# Patient Record
(demographics unavailable — no encounter records)

---

## 2025-01-03 NOTE — HISTORY OF PRESENT ILLNESS
[FreeTextEntry1] : This is a 66 year old female originally referred by Dr. Henao for a painful left breast mass. She was last seen by Dr. Hernandez 3 years ago. 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 She is a s/p a left FNA. She had a history bilateral mastodynia, left more than right. She has not had any breast imaging since 6/25/2015 which was WNL.   Patient presents today with c/o occasional bilateral breast tenderness L>R.  Patient is s/p bilateral breast FNA of bilateral cysts on 2/27/2018. On the right a total of 30cc of brownish material was aspirated with complete collapse of the cyst. On the left, a total of 35cc of brownish fluid was aspirated with complete collapse of the cyst.  The aspirated material was not sent for analysis.   She has significant depression and has stress related to her son's psychiatric illness. She states her breast pain is better.  She does SBE.  She has noticed a change in her right breast or a right breast lump. She has not noticed a change in her nipple or nipple area. She has not noticed a change in the skin of the breast. She is not experiencing nipple discharge. She is not experiencing breast pain. She has not noticed a lump or lymph node under the armpit.   BREAST CANCER RISK FACTORS Menarche: 12 Date of LMP: 50 Menopause: Grav: 3     Para: 2 Age at first live birth: 32  Nursed: N Hysterectomy: N Oophorectomy: N  OCP: Y <1y HRT: Y estrogen patch <1y 5 years ago, currently on Estrace Last pap/pelvic exam: 2023 WNL  Related family history: N  Ashkenazi: N  Mastery risk assessment: BRCAPRO 8.6% TCv6 8.2% TCv7 11.1% Rebekah 9.9% Paras n/a BRCA testing: N  Bra size: 38C  Last mammogram: 12/23/2024           Location: [facility]  Report reviewed.                                 Images reviewed. Results: BIRADS 2 Extremely dense breasts, with no evidence of malignancy.    Last ultrasound:  12/23/2024             Location: [facility]  Report reviewed.                                 Images reviewed.  Results: BIRADS 2 Waxing and waning cysts, no sonographic evidence of malignancy.  Last MRI:  Never                                           Location: Report reviewed.

## 2025-01-03 NOTE — PHYSICAL EXAM
[Normocephalic] : normocephalic [Atraumatic] : atraumatic [Supple] : supple [No Supraclavicular Adenopathy] : no supraclavicular adenopathy [Examined in the supine and seated position] : examined in the supine and seated position [Asymmetrical] : asymmetrical [No Nipple Retraction] : no left nipple retraction [No Nipple Discharge] : no left nipple discharge [No Axillary Lymphadenopathy] : no left axillary lymphadenopathy [No Edema] : no edema [No Rashes] : no rashes [No Ulceration] : no ulceration [de-identified] : there is a subcentimeter cm dominant mass in area of pt concern which is smaller than prior [de-identified] : there is a central mass with nodularity which is smaller than prior

## 2025-01-03 NOTE — HISTORY OF PRESENT ILLNESS
[FreeTextEntry1] : This is a 66 year old female originally referred by Dr. Henao for a painful left breast mass. She was last seen by Dr. Hernandez 3 years ago. 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 She is a s/p a left FNA. She had a history bilateral mastodynia, left more than right. She has not had any breast imaging since 6/25/2015 which was WNL.   Patient presents today with c/o occasional bilateral breast tenderness L>R.  Patient is s/p bilateral breast FNA of bilateral cysts on 2/27/2018. On the right a total of 30cc of brownish material was aspirated with complete collapse of the cyst. On the left, a total of 35cc of brownish fluid was aspirated with complete collapse of the cyst.  The aspirated material was not sent for analysis.   She has significant depression and has stress related to her son's psychiatric illness. She states her breast pain is better.  She does SBE.  She has noticed a change in her right breast or a right breast lump. She has not noticed a change in her nipple or nipple area. She has not noticed a change in the skin of the breast. She is not experiencing nipple discharge. She is not experiencing breast pain. She has not noticed a lump or lymph node under the armpit.   BREAST CANCER RISK FACTORS Menarche: 12 Date of LMP: 50 Menopause: Grav: 3     Para: 2 Age at first live birth: 32  Nursed: N Hysterectomy: N Oophorectomy: N  OCP: Y <1y HRT: Y estrogen patch <1y 5 years ago, currently on Estrace Last pap/pelvic exam: 2023 WNL  Related family history: N  Ashkenazi: N  Mastery risk assessment: BRCAPRO 8.6% TCv6 8.2% TCv7 11.1% Rebekah 9.9% Paras n/a BRCA testing: N  Bra size: 38C  Last mammogram: 12/23/2024           Location: [facility]  Report reviewed.                                 Images reviewed. Results: BIRADS 2 Extremely dense breasts, with no evidence of malignancy.    Last ultrasound:  12/23/2024             Location: [facility]  Report reviewed.                                 Images reviewed.  Results: BIRADS 2 Waxing and waning cysts, no sonographic evidence of malignancy.  Last MRI:  Never                                           Location: Report reviewed.

## 2025-01-03 NOTE — CONSULT LETTER
[Dear  ___] : Dear  [unfilled], [Courtesy Letter:] : I had the pleasure of seeing your patient, [unfilled], in my office today. [Please see my note below.] : Please see my note below. [Consult Closing:] : Thank you very much for allowing me to participate in the care of this patient.  If you have any questions, please do not hesitate to contact me. [Sincerely,] : Sincerely, [FreeTextEntry3] : Gina [place] [address]

## 2025-01-03 NOTE — CONSULT LETTER
[Dear  ___] : Dear  [unfilled], [Courtesy Letter:] : I had the pleasure of seeing your patient, [unfilled], in my office today. [Please see my note below.] : Please see my note below. [Consult Closing:] : Thank you very much for allowing me to participate in the care of this patient.  If you have any questions, please do not hesitate to contact me. [Sincerely,] : Sincerely, [FreeTextEntry3] : [place] [address]

## 2025-01-03 NOTE — ASSESSMENT
[FreeTextEntry1] : Dense breast tissue (793.82) (R92.2) Breast cancer screening by mammogram (V76.12) (Z12.31) 67 yo female with FCD  plan mg/sono 12/2025  We reviewed risk reduction strategies including maintaining a BMI <25, limiting red meat intake and alcoholic beverages to 3 per week and exercise (150 min/ week low intensity or 75 min/week high intensity). And maintaining a normal vitamin D level.    f/u 1 year  She knows to call or return sooner should any concerns or questions arise.

## 2025-01-03 NOTE — ASSESSMENT
[FreeTextEntry1] : Dense breast tissue (793.82) (R92.2) Breast cancer screening by mammogram (V76.12) (Z12.31) 65 yo female with FCD  plan mg/sono 12/2025  We reviewed risk reduction strategies including maintaining a BMI <25, limiting red meat intake and alcoholic beverages to 3 per week and exercise (150 min/ week low intensity or 75 min/week high intensity). And maintaining a normal vitamin D level.    f/u 1 year  She knows to call or return sooner should any concerns or questions arise.

## 2025-01-03 NOTE — HISTORY OF PRESENT ILLNESS
[FreeTextEntry1] : This is a 66 year old female originally referred by Dr. Henao for a painful left breast mass. She was last seen by Dr. Hernandez 3 years ago. 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 She is a s/p a left FNA. She had a history bilateral mastodynia, left more than right. She has not had any breast imaging since 6/25/2015 which was WNL.   Patient presents today with c/o occasional bilateral breast tenderness L>R.  Patient is s/p bilateral breast FNA of bilateral cysts on 2/27/2018. On the right a total of 30cc of brownish material was aspirated with complete collapse of the cyst. On the left, a total of 35cc of brownish fluid was aspirated with complete collapse of the cyst.  The aspirated material was not sent for analysis.   She has significant depression and has stress related to her son's psychiatric illness. She states her breast pain is better.  She does SBE.  She has noticed a change in her right breast or a right breast lump. She has not noticed a change in her nipple or nipple area. She has not noticed a change in the skin of the breast. She is not experiencing nipple discharge. She is not experiencing breast pain. She has not noticed a lump or lymph node under the armpit.   BREAST CANCER RISK FACTORS Menarche: 12 Date of LMP: 50 Menopause: Grav: 3     Para: 2 Age at first live birth: 32  Nursed: N Hysterectomy: N Oophorectomy: N  OCP: Y <1y HRT: Y estrogen patch <1y 5 years ago, currently on Estrace Last pap/pelvic exam: 2023 WNL  Related family history: N  Ashkenazi: N  Mastery risk assessment: BRCAPRO 8.6% TCv6 8.2% TCv7 11.1% Rebekah 9.9% Paras n/a BRCA testing: N  Bra size: 38C  Last mammogram: 12/23/2024           Location: [facility]  Report reviewed.                                 Images reviewed. Results: BIRADS 2 Extremely dense breasts, with no evidence of malignancy.    Last ultrasound:  12/23/2024             Location: [facility]  Report reviewed.                                 Images reviewed.  Results: BIRADS 2 Waxing and waning cysts, no sonographic evidence of malignancy.  Last MRI:  Never                                           Location: Report reviewed.

## 2025-01-03 NOTE — PHYSICAL EXAM
[Normocephalic] : normocephalic [Atraumatic] : atraumatic [Supple] : supple [No Supraclavicular Adenopathy] : no supraclavicular adenopathy [Examined in the supine and seated position] : examined in the supine and seated position [Asymmetrical] : asymmetrical [No Nipple Retraction] : no left nipple retraction [No Nipple Discharge] : no left nipple discharge [No Axillary Lymphadenopathy] : no left axillary lymphadenopathy [No Edema] : no edema [No Rashes] : no rashes [No Ulceration] : no ulceration [de-identified] : there is a subcentimeter cm dominant mass in area of pt concern which is smaller than prior [de-identified] : there is a central mass with nodularity which is smaller than prior

## 2025-01-03 NOTE — CONSULT LETTER
[Dear  ___] : Dear  [unfilled], [Courtesy Letter:] : I had the pleasure of seeing your patient, [unfilled], in my office today. [Please see my note below.] : Please see my note below. [Consult Closing:] : Thank you very much for allowing me to participate in the care of this patient.  If you have any questions, please do not hesitate to contact me. [Sincerely,] : Sincerely, [FreeTextEntry3] : [place] [address]

## 2025-01-03 NOTE — PHYSICAL EXAM
[Normocephalic] : normocephalic [Atraumatic] : atraumatic [Supple] : supple [No Supraclavicular Adenopathy] : no supraclavicular adenopathy [Examined in the supine and seated position] : examined in the supine and seated position [Asymmetrical] : asymmetrical [No Nipple Retraction] : no left nipple retraction [No Nipple Discharge] : no left nipple discharge [No Axillary Lymphadenopathy] : no left axillary lymphadenopathy [No Edema] : no edema [No Rashes] : no rashes [No Ulceration] : no ulceration [de-identified] : there is a subcentimeter cm dominant mass in area of pt concern which is smaller than prior [de-identified] : there is a central mass with nodularity which is smaller than prior